# Patient Record
Sex: MALE | Race: BLACK OR AFRICAN AMERICAN | Employment: OTHER | URBAN - METROPOLITAN AREA
[De-identification: names, ages, dates, MRNs, and addresses within clinical notes are randomized per-mention and may not be internally consistent; named-entity substitution may affect disease eponyms.]

---

## 2020-09-15 ENCOUNTER — TELEPHONE (OUTPATIENT)
Dept: FAMILY MEDICINE CLINIC | Age: 65
End: 2020-09-15

## 2020-09-15 NOTE — TELEPHONE ENCOUNTER
40 I HAD HANDED ALL RECORDS I HAD TO [address] [place] [address] I JOINED THEM IN 2011. PAPER CHARTS WERE KEPT IN MY [place] OFFICE TILL I WAS THERE. I DO NOT KNOW WHAT HAPPENED TO THOSE CHARTS WHEN I LEFT THAT OFFICE TO COME HERE. SEE IF MEDICAL RECORDS HAVE ANY OLDER CHARTS WITH THEM. LEGALLY WE ARE OBLIGATED TO KEEP CHARTS FOR 7 YEARS AS PER MY UNDERSTANDING. YOU CAN VERIFY WITH THE LEGAL DEPARTMENT. 40 [phone] 40

## 2020-09-15 NOTE — TELEPHONE ENCOUNTER
Patient called stating he contacted Medical Records Dept and rec'd his medical records through 2010. Patient stated he had seen Dr. Brady Montana as far back as 1986 and wanted to know how he would be able to obtain records earlier than 2010. Patient stated he needs these records because he is working with the [facility] and his therapist and needs to show documentation of treatment he rec'd over the years. Please advise.